# Patient Record
Sex: FEMALE | Race: WHITE | Employment: UNEMPLOYED | URBAN - METROPOLITAN AREA
[De-identification: names, ages, dates, MRNs, and addresses within clinical notes are randomized per-mention and may not be internally consistent; named-entity substitution may affect disease eponyms.]

---

## 2019-04-13 PROBLEM — R20.0 LEFT FACIAL NUMBNESS: Status: ACTIVE | Noted: 2019-04-13

## 2019-08-02 ENCOUNTER — OFFICE VISIT (OUTPATIENT)
Dept: CARDIOLOGY CLINIC | Age: 64
End: 2019-08-02

## 2019-08-02 VITALS
SYSTOLIC BLOOD PRESSURE: 147 MMHG | OXYGEN SATURATION: 97 % | WEIGHT: 200 LBS | HEIGHT: 66 IN | HEART RATE: 77 BPM | BODY MASS INDEX: 32.14 KG/M2 | DIASTOLIC BLOOD PRESSURE: 81 MMHG

## 2019-08-02 DIAGNOSIS — G45.9 TIA (TRANSIENT ISCHEMIC ATTACK): Primary | ICD-10-CM

## 2019-08-02 DIAGNOSIS — E11.8 TYPE 2 DIABETES MELLITUS WITH COMPLICATION, WITHOUT LONG-TERM CURRENT USE OF INSULIN (HCC): ICD-10-CM

## 2019-08-02 DIAGNOSIS — I10 ESSENTIAL HYPERTENSION: ICD-10-CM

## 2019-08-02 DIAGNOSIS — Z86.73 HISTORY OF CVA (CEREBROVASCULAR ACCIDENT): ICD-10-CM

## 2019-08-02 DIAGNOSIS — G47.33 OSA (OBSTRUCTIVE SLEEP APNEA): ICD-10-CM

## 2019-08-02 RX ORDER — LEVOTHYROXINE SODIUM 75 UG/1
TABLET ORAL
COMMUNITY

## 2019-08-02 RX ORDER — ATORVASTATIN CALCIUM 80 MG/1
TABLET, FILM COATED ORAL
COMMUNITY

## 2019-08-02 NOTE — PROGRESS NOTES
1. Have you been to the ER, urgent care clinic since your last visit? Hospitalized since your last visit? No    2. Have you seen or consulted any other health care providers outside of the [address] [place] [address] since your last visit? Include any pap smears or colon screening.  No

## 2019-08-10 PROBLEM — I10 ESSENTIAL HYPERTENSION: Status: ACTIVE | Noted: 2019-08-10

## 2019-08-10 PROBLEM — Z86.73 HISTORY OF CVA (CEREBROVASCULAR ACCIDENT): Status: ACTIVE | Noted: 2019-08-10

## 2019-08-10 PROBLEM — G47.33 OSA (OBSTRUCTIVE SLEEP APNEA): Status: ACTIVE | Noted: 2019-08-10

## 2019-08-10 NOTE — PROGRESS NOTES
Subjective: [facility] is in the office today for cardiac evaluation. She has a history of CVA in March 2019. She also was readmitted in April 2019 for more left-sided facial tingling and numbness. She has had a previous occipital infarct related to a right PCA stenosis with consequent residual visual field cut over left visual field. In regard to prior cardiac evaluation she had a Holter monitor done in May of this year. It demonstrated sinus rhythm throughout. There were rare PACs. There were 2 episodes of 4 beat SVT. She also had an echocardiogram done at that time. The echocardiogram was essentially normal.  There was normal systolic function. There was grade 1 diastolic dysfunction. She had a negative bubble study in March 2019. She has no known history of atrial fibrillation. He has no palpitations. She was having some shortness of breath which improved with physical therapy, walking with her neighbor, etc.  She has had no chest pain. She has had no near syncope or syncope. Patient's cardiac risk factors are dyslipidemia, diabetes mellitus, hypertension.         Patient Active Problem List    Diagnosis Date Noted    Essential hypertension 08/10/2019    History of CVA (cerebrovascular accident) 08/10/2019    FINA (obstructive sleep apnea) 08/10/2019    Left facial numbness 04/13/2019    Other and unspecified hyperlipidemia 05/03/2010    Depressive disorder, not elsewhere classified 05/03/2010    Reflux 05/03/2010    Diverticulosis 05/03/2010    OA (osteoarthritis) 05/03/2010    Type 2 diabetes mellitus with complication, without long-term current use of insulin ([facility] 05/03/2010    Unspecified hypothyroidism 05/03/2010    Vitamin D deficiency 05/03/2010    Insomnia 05/03/2010    HSV infection 05/03/2010     Current Outpatient Medications   Medication Sig Dispense Refill    atorvastatin (LIPITOR) 80 mg tablet TAKE 1 TABLET BY MOUTH AT BEDTIME      levothyroxine (SYNTHROID) 75 mcg tablet TAKE 1 TABLET BY MOUTH ONCE DAILY IN THE MORNING ON AN EMPTY STOMACH      clopidogrel (PLAVIX) 75 mg tab Take 1 Tab by mouth daily. 30 Tab 2    losartan (COZAAR) 25 mg tablet Take 25 mg by mouth daily. Indications: high blood pressure      raNITIdine (ZANTAC) 150 mg tablet Take 150 mg by mouth two (2) times a day. Indications: Stomach Ulcer from Aspirin or Ibuprofen-Like Drugs      aspirin (ASPIRIN) 325 mg tablet Take 325 mg by mouth daily. Indications: Prevention for a Blood Clot going to the Brain      metFORMIN (GLUCOPHAGE) 1,000 mg tablet Take 1,000 mg by mouth two (2) times daily (with meals). Indications: type 2 diabetes mellitus      citalopram (CELEXA) 20 mg tablet Take 20 mg by mouth daily.  dextroamphetamine (DEXTROSTAT) 10 mg tab Take 10 mg by mouth daily.  cyanocobalamin (VITAMIN B-12) 1,000 mcg tablet Take 1,000 mcg by mouth daily.  cholecalciferol, vitamin d3, (VITAMIN D) 1,000 unit tablet Take  by mouth daily. Allergies   Allergen Reactions    Lisinopril Cough     Past Medical History:   Diagnosis Date    Diabetes ([facility]     Hypertension     [facility]      No past surgical history on file. No family history on file.   Social History     Tobacco Use   Smoking Status Never Smoker   Smokeless Tobacco Never Used          Review of Systems, additional:  Constitutional: negative  Eyes: negative  Respiratory: negative  Cardiovascular: negative  Gastrointestinal: negative  Musculoskeletal:negative  Neurological: negative  Behvioral/Psych: negative  Endocrine: negative  ENT: negative    Objective:     Visit Vitals  /81   Pulse 77   Ht 5' 6\" (1.676 m)   Wt 200 lb (90.7 kg)   SpO2 97%   BMI 32.28 kg/m²     General:  alert, cooperative, no distress   Chest Wall: inspection normal - no chest wall deformities or tenderness, respiratory effort normal   Lung: clear to auscultation bilaterally   Heart:  normal rate and regular rhythm, S1 and S2 normal, no murmurs noted, no gallops noted, no JVD   Abdomen: soft, non-tender. Bowel sounds normal. No masses,  no organomegaly   Extremities: extremities normal, atraumatic, no cyanosis or edema Skin: no rashes   Neuro: alert, oriented, normal speech, no  movement disorder noted     EK2019. Normal sinus rhythm. Normal.    Assessment/Plan:       ICD-10-CM ICD-9-CM    1. TIA (transient ischemic attack), will arrange for 30-day event monitor. Return in 6 weeks G45.9 435.9 CARDIAC EVENT MONITOR   2. Type 2 diabetes mellitus with complication, without long-term current use of insulin (HCC) E11.8 250.90    3. History of CVA (cerebrovascular accident), right occipital infarct with right CVA stenosis with residual visual field cut over left visual field. She follows with  Z86.73 V12.54    4. Essential hypertension, mildly elevated systolic BP in the office today I10 401.9    5. FINA (obstructive sleep apnea) G47.33 327.23    6       History of echocardiogram, 2019. Normal left ventricular systolic function. Grade 1 diastolic dysfunction. No valvular pathology.   Negative  bubble study

## 2019-09-18 ENCOUNTER — OFFICE VISIT (OUTPATIENT)
Dept: CARDIOLOGY CLINIC | Age: 64
End: 2019-09-18

## 2019-09-18 ENCOUNTER — TELEPHONE (OUTPATIENT)
Dept: CARDIOLOGY CLINIC | Age: 64
End: 2019-09-18

## 2019-09-18 VITALS
DIASTOLIC BLOOD PRESSURE: 82 MMHG | BODY MASS INDEX: 32.62 KG/M2 | HEART RATE: 65 BPM | OXYGEN SATURATION: 98 % | HEIGHT: 66 IN | SYSTOLIC BLOOD PRESSURE: 189 MMHG | WEIGHT: 203 LBS

## 2019-09-18 DIAGNOSIS — R20.9 CVA, OLD, ALTERATIONS OF SENSATIONS: Primary | ICD-10-CM

## 2019-09-18 DIAGNOSIS — I69.398 CVA, OLD, ALTERATIONS OF SENSATIONS: Primary | ICD-10-CM

## 2019-09-18 NOTE — PROGRESS NOTES
1. Have you been to the ER, urgent care clinic since your last visit? Hospitalized since your last visit? No    2. Have you seen or consulted any other health care providers outside of the [address] [place] [address] since your last visit? Include any pap smears or colon screening.  No

## 2019-09-18 NOTE — TELEPHONE ENCOUNTER
Verbal order and read back per Kishan Moore MD  Received end of service report no need for extra 2 weeks

## 2019-09-18 NOTE — TELEPHONE ENCOUNTER
Contacted pt at cell number. Two patient Identifiers confirmed. Advised pt per Dr Edith Apley notes. Pt verbalized understanding.

## 2019-09-22 NOTE — PROGRESS NOTES
Subjective: Claudene Aures is in the office today for cardiac evaluation. She has a history of a CVA in March 2019. She also was readmitted in April 2019 for more left-sided facial tingling and numbness. She has had a previous occipital infarct related to a right PCA stenosis with consequent residual visual field cut over her left visual field. In regard to prior cardiac evaluation she had a Holter monitor done in May of this year. It demonstrated sinus rhythm throughout. There were rare PACs. There were 2 episodes of 4 beat SVT. She also had an echocardiogram done at that time. The echocardiogram was essentially normal.  There was normal systolic function. There was grade 1 diastolic dysfunction. She had a negative bubble study in March 2019.    30-day monitor was ordered. It appears that it is complete and does not demonstrate any evidence for atrial fibrillation. In the office today she says she is \"okay \". She has had no dizziness or lightheadedness. She does report that her heart has \"kind of fast beats every once in a while \". She has had no chest pain or shortness of breath. She has been walking nightly with her neighbor. Patient's cardiac risk factors are dyslipidemia, diabetes mellitus, hypertension.         Patient Active Problem List    Diagnosis Date Noted    Essential hypertension 08/10/2019    History of CVA (cerebrovascular accident) 08/10/2019    FINA (obstructive sleep apnea) 08/10/2019    Left facial numbness 04/13/2019    Other and unspecified hyperlipidemia 05/03/2010    Depressive disorder, not elsewhere classified 05/03/2010    Reflux 05/03/2010    Diverticulosis 05/03/2010    OA (osteoarthritis) 05/03/2010    Type 2 diabetes mellitus with complication, without long-term current use of insulin ([facility] 05/03/2010    Unspecified hypothyroidism 05/03/2010    Vitamin D deficiency 05/03/2010    Insomnia 05/03/2010    HSV infection 05/03/2010     Current Outpatient Medications   Medication Sig Dispense Refill    atorvastatin (LIPITOR) 80 mg tablet TAKE 1 TABLET BY MOUTH AT BEDTIME      levothyroxine (SYNTHROID) 75 mcg tablet TAKE 1 TABLET BY MOUTH ONCE DAILY IN THE MORNING ON AN EMPTY STOMACH      clopidogrel (PLAVIX) 75 mg tab Take 1 Tab by mouth daily. 30 Tab 2    losartan (COZAAR) 25 mg tablet Take 25 mg by mouth daily. Indications: high blood pressure      raNITIdine (ZANTAC) 150 mg tablet Take 150 mg by mouth two (2) times a day. Indications: Stomach Ulcer from Aspirin or Ibuprofen-Like Drugs      aspirin (ASPIRIN) 325 mg tablet Take 325 mg by mouth daily. Indications: Prevention for a Blood Clot going to the Brain      metFORMIN (GLUCOPHAGE) 1,000 mg tablet Take 1,000 mg by mouth two (2) times daily (with meals). Indications: type 2 diabetes mellitus      citalopram (CELEXA) 20 mg tablet Take 20 mg by mouth daily.  dextroamphetamine (DEXTROSTAT) 10 mg tab Take 10 mg by mouth daily.  cyanocobalamin (VITAMIN B-12) 1,000 mcg tablet Take 1,000 mcg by mouth daily.  cholecalciferol, vitamin d3, (VITAMIN D) 1,000 unit tablet Take  by mouth daily. Allergies   Allergen Reactions    Lisinopril Cough     Past Medical History:   Diagnosis Date    Diabetes ([facility]     Hypertension     [facility]      No past surgical history on file. No family history on file.   Social History     Tobacco Use   Smoking Status Never Smoker   Smokeless Tobacco Never Used          Review of Systems, additional:  Constitutional: negative  Eyes: negative  Respiratory: negative  Cardiovascular: negative  Gastrointestinal: negative  Musculoskeletal:negative  Neurological: negative  Behvioral/Psych: negative  Endocrine: negative  ENT: negative    Objective:     Visit Vitals  /82   Pulse 65   Ht 5' 6\" (1.676 m)   Wt 92.1 kg (203 lb)   SpO2 98%   BMI 32.77 kg/m²     General:  alert, cooperative, no distress   Chest Wall: inspection normal - no chest wall deformities or tenderness, respiratory effort normal   Lung: clear to auscultation bilaterally   Heart:  normal rate and regular rhythm, S1 and S2 normal, no murmurs noted, no gallops noted, no JVD   Abdomen: soft, non-tender. Bowel sounds normal. No masses,  no organomegaly   Extremities: extremities normal, atraumatic, no cyanosis or edema Skin: no rashes   Neuro: alert, oriented, normal speech, no  movement disorder noted     EK2019. Normal sinus rhythm. Normal.    Assessment/Plan:       ICD-10-CM ICD-9-CM    1. TIA (transient ischemic attack), will arrange for 30-day event monitor. Reviewed most recent data with no significant arrhythmia noted. Return 4 weeks G45.9 435.9 CARDIAC EVENT MONITOR   2. Type 2 diabetes mellitus with complication, without long-term current use of insulin (HCC) E11.8 250.90    3. History of CVA (cerebrovascular accident), right occipital infarct with right CVA stenosis with residual visual field cut over left visual field. She follows with  Z86.73 V12.54    4. Essential hypertension, mildly elevated systolic BP in the office again today I10 401.9    5. FINA (obstructive sleep apnea) G47.33 327.23    6       History of echocardiogram, 2019. Normal left ventricular systolic function. Grade 1 diastolic dysfunction. No valvular pathology.   Negative  bubble study

## 2019-10-18 ENCOUNTER — OFFICE VISIT (OUTPATIENT)
Dept: CARDIOLOGY CLINIC | Age: 64
End: 2019-10-18

## 2019-10-18 VITALS
OXYGEN SATURATION: 97 % | BODY MASS INDEX: 32.77 KG/M2 | HEART RATE: 64 BPM | WEIGHT: 203 LBS | DIASTOLIC BLOOD PRESSURE: 83 MMHG | SYSTOLIC BLOOD PRESSURE: 168 MMHG

## 2019-10-18 DIAGNOSIS — Z86.73 HISTORY OF CVA (CEREBROVASCULAR ACCIDENT): Primary | ICD-10-CM

## 2019-10-18 NOTE — PROGRESS NOTES
1. Have you been to the ER, urgent care clinic since your last visit? Hospitalized since your last visit? No     2. Have you seen or consulted any other health care providers outside of the [address] [place] [address] since your last visit? Include any pap smears or colon screening.   No

## 2019-10-23 NOTE — PROGRESS NOTES
Subjective: Yolanda Finch is in the office today for cardiac re-evaluation. She has a history of a CVA in March 2019. She also was readmitted in April 2019 for more left-sided facial tingling and numbness. She has had a previous occipital infarct related to a right PCA stenosis with consequent residual visual field cut over her left visual field. In regard to prior cardiac evaluation she had a Holter monitor done in May of this year. It demonstrated sinus rhythm throughout. There were rare PACs. There were 2 episodes of 4 beat SVT. She also had an echocardiogram done at that time. The echocardiogram was essentially normal.  There was normal systolic function. There was grade 1 diastolic dysfunction. She had a negative bubble study in March 2019.    30-day monitor was ordered. Monitor did not demonstrate any evidence for atrial fibrillation. In the office today she says she is doing well. She has not had much palpitations. She continues in physical therapy for her stroke. She is trying to be as independent as possible. She is walking a mile a day with her neighbor  Patient's cardiac risk factors are dyslipidemia, diabetes mellitus, hypertension.         Patient Active Problem List    Diagnosis Date Noted    Essential hypertension 08/10/2019    History of CVA (cerebrovascular accident) 08/10/2019    FINA (obstructive sleep apnea) 08/10/2019    Left facial numbness 04/13/2019    Other and unspecified hyperlipidemia 05/03/2010    Depressive disorder, not elsewhere classified 05/03/2010    Reflux 05/03/2010    Diverticulosis 05/03/2010    OA (osteoarthritis) 05/03/2010    Type 2 diabetes mellitus with complication, without long-term current use of insulin ([facility] 05/03/2010    Unspecified hypothyroidism 05/03/2010    Vitamin D deficiency 05/03/2010    Insomnia 05/03/2010    HSV infection 05/03/2010     Current Outpatient Medications   Medication Sig Dispense Refill    atorvastatin (LIPITOR) 80 mg tablet TAKE 1 TABLET BY MOUTH AT BEDTIME      levothyroxine (SYNTHROID) 75 mcg tablet TAKE 1 TABLET BY MOUTH ONCE DAILY IN THE MORNING ON AN EMPTY STOMACH      clopidogrel (PLAVIX) 75 mg tab Take 1 Tab by mouth daily. 30 Tab 2    losartan (COZAAR) 25 mg tablet Take 25 mg by mouth daily. Indications: high blood pressure      raNITIdine (ZANTAC) 150 mg tablet Take 150 mg by mouth two (2) times a day. Indications: Stomach Ulcer from Aspirin or Ibuprofen-Like Drugs      aspirin (ASPIRIN) 325 mg tablet Take 325 mg by mouth daily. Indications: Prevention for a Blood Clot going to the Brain      metFORMIN (GLUCOPHAGE) 1,000 mg tablet Take 1,000 mg by mouth two (2) times daily (with meals). Indications: type 2 diabetes mellitus      citalopram (CELEXA) 20 mg tablet Take 20 mg by mouth daily.  cyanocobalamin (VITAMIN B-12) 1,000 mcg tablet Take 1,000 mcg by mouth daily.  cholecalciferol, vitamin d3, (VITAMIN D) 1,000 unit tablet Take  by mouth daily. Allergies   Allergen Reactions    Lisinopril Cough     Past Medical History:   Diagnosis Date    Diabetes ([facility]     Hypertension     [facility]      No past surgical history on file. No family history on file.   Social History     Tobacco Use   Smoking Status Never Smoker   Smokeless Tobacco Never Used          Review of Systems, additional:  Constitutional: negative  Eyes: negative  Respiratory: negative  Cardiovascular: negative  Gastrointestinal: negative  Musculoskeletal:negative  Neurological: negative  Behvioral/Psych: negative  Endocrine: negative  ENT: negative    Objective:     Visit Vitals  /83   Pulse 64   Wt 92.1 kg (203 lb)   SpO2 97%   BMI 32.77 kg/m²     General:  alert, cooperative, no distress   Chest Wall: inspection normal - no chest wall deformities or tenderness, respiratory effort normal   Lung: clear to auscultation bilaterally   Heart:  normal rate and regular rhythm, S1 and S2 normal, no murmurs noted, no gallops noted, no JVD   Abdomen: soft, non-tender. Bowel sounds normal. No masses,  no organomegaly   Extremities: extremities normal, atraumatic, no cyanosis or edema Skin: no rashes   Neuro: alert, oriented, normal speech, no  movement disorder noted     EK2019. Normal sinus rhythm. Normal.    Assessment/Plan:       ICD-10-CM ICD-9-CM    1. TIA (transient ischemic attack), will arrange for 30-day event monitor. Reviewed most recent data with no significant arrhythmia noted. Return 6 mos6 months G45.9 435.9 CARDIAC EVENT MONITOR   2. Type 2 diabetes mellitus with complication, without long-term current use of insulin (HCC) E11.8 250.90    3. History of CVA (cerebrovascular accident), right occipital infarct with right CVA stenosis with residual visual field cut over left visual field. She follows with  Z86.73 V12.54    4. Essential hypertension, moderately elevated systolic BP in the office again today I10 401.9    5. FINA (obstructive sleep apnea) G47.33 327.23    6       History of echocardiogram, 2019. Normal left ventricular systolic function. Grade 1 diastolic dysfunction. No valvular pathology.   Negative  bubble study

## 2019-11-22 ENCOUNTER — HOSPITAL ENCOUNTER (OUTPATIENT)
Dept: LAB | Age: 64
Discharge: HOME | End: 2019-11-22
Payer: MEDICARE

## 2019-11-22 ENCOUNTER — OFFICE VISIT (OUTPATIENT)
Dept: FAMILY MEDICINE CLINIC | Age: 64
End: 2019-11-22

## 2019-11-22 VITALS
RESPIRATION RATE: 18 BRPM | OXYGEN SATURATION: 97 % | HEART RATE: 66 BPM | DIASTOLIC BLOOD PRESSURE: 81 MMHG | BODY MASS INDEX: 33.43 KG/M2 | SYSTOLIC BLOOD PRESSURE: 175 MMHG | TEMPERATURE: 98.2 F | HEIGHT: 66 IN | WEIGHT: 208 LBS

## 2019-11-22 DIAGNOSIS — I10 ESSENTIAL HYPERTENSION: ICD-10-CM

## 2019-11-22 DIAGNOSIS — E11.8 TYPE 2 DIABETES MELLITUS WITH COMPLICATION, WITHOUT LONG-TERM CURRENT USE OF INSULIN (HCC): ICD-10-CM

## 2019-11-22 DIAGNOSIS — E11.8 TYPE 2 DIABETES MELLITUS WITH COMPLICATION, WITHOUT LONG-TERM CURRENT USE OF INSULIN (HCC): Primary | ICD-10-CM

## 2019-11-22 DIAGNOSIS — E55.9 VITAMIN D DEFICIENCY: ICD-10-CM

## 2019-11-22 DIAGNOSIS — Z86.73 HISTORY OF CVA (CEREBROVASCULAR ACCIDENT): ICD-10-CM

## 2019-11-22 DIAGNOSIS — E03.9 ACQUIRED HYPOTHYROIDISM: ICD-10-CM

## 2019-11-22 DIAGNOSIS — Z71.89 ACP (ADVANCE CARE PLANNING): ICD-10-CM

## 2019-11-22 DIAGNOSIS — Z00.00 WELCOME TO MEDICARE PREVENTIVE VISIT: ICD-10-CM

## 2019-11-22 DIAGNOSIS — M81.0 OSTEOPOROSIS WITHOUT CURRENT PATHOLOGICAL FRACTURE, UNSPECIFIED OSTEOPOROSIS TYPE: ICD-10-CM

## 2019-11-22 DIAGNOSIS — F32.A DEPRESSION, UNSPECIFIED DEPRESSION TYPE: ICD-10-CM

## 2019-11-22 LAB
ALBUMIN SERPL-MCNC: 4.1 G/DL (ref 3.4–5)
ALBUMIN/GLOB SERPL: 1.2 {RATIO} (ref 0.8–1.7)
ALP SERPL-CCNC: 94 U/L (ref 45–117)
ALT SERPL-CCNC: 30 U/L (ref 13–56)
ANION GAP SERPL CALC-SCNC: 4 MMOL/L (ref 3–18)
AST SERPL-CCNC: 11 U/L (ref 10–38)
BILIRUB SERPL-MCNC: 0.5 MG/DL (ref 0.2–1)
BUN SERPL-MCNC: 15 MG/DL (ref 7–18)
BUN/CREAT SERPL: 21 (ref 12–20)
CALCIUM SERPL-MCNC: 9.2 MG/DL (ref 8.5–10.1)
CHLORIDE SERPL-SCNC: 104 MMOL/L (ref 100–111)
CO2 SERPL-SCNC: 31 MMOL/L (ref 21–32)
CREAT SERPL-MCNC: 0.73 MG/DL (ref 0.6–1.3)
GLOBULIN SER CALC-MCNC: 3.3 G/DL (ref 2–4)
GLUCOSE SERPL-MCNC: 217 MG/DL (ref 74–99)
POTASSIUM SERPL-SCNC: 4.9 MMOL/L (ref 3.5–5.5)
PROT SERPL-MCNC: 7.4 G/DL (ref 6.4–8.2)
SODIUM SERPL-SCNC: 139 MMOL/L (ref 136–145)

## 2019-11-22 PROCEDURE — 82306 VITAMIN D 25 HYDROXY: CPT

## 2019-11-22 PROCEDURE — 80053 COMPREHEN METABOLIC PANEL: CPT

## 2019-11-22 PROCEDURE — 82043 UR ALBUMIN QUANTITATIVE: CPT

## 2019-11-22 PROCEDURE — 84439 ASSAY OF FREE THYROXINE: CPT

## 2019-11-22 PROCEDURE — 83036 HEMOGLOBIN GLYCOSYLATED A1C: CPT

## 2019-11-22 RX ORDER — BLOOD SUGAR DIAGNOSTIC
STRIP MISCELLANEOUS
COMMUNITY
Start: 2019-11-05

## 2019-11-22 NOTE — PROGRESS NOTES
Charanjit Monet is a 71 y.o. female  Here today for new establishment of care. Had two strokes this year, first one back in March 2019 and second in April 2019. Notes already in chart and pt has brought in notes and all specialist she sees.

## 2019-11-22 NOTE — PATIENT INSTRUCTIONS
Medicare Wellness Visit, Female The best way to live healthy is to have a lifestyle where you eat a well-balanced diet, exercise regularly, limit alcohol use, and quit all forms of tobacco/nicotine, if applicable. Regular preventive services are another way to keep healthy. Preventive services (vaccines, screening tests, monitoring & exams) can help personalize your care plan, which helps you manage your own care. Screening tests can find health problems at the earliest stages, when they are easiest to treat. [facility] follows the current, evidence-based guidelines published by the [facility] ([facility]) when recommending preventive services for our patients. Because we follow these guidelines, sometimes recommendations change over time as research supports it. (For example, mammograms used to be recommended annually. Even though Medicare will still pay for an annual mammogram, the newer guidelines recommend a mammogram every two years for women of average risk). Of course, you and your doctor may decide to screen more often for some diseases, based on your risk and your co-morbidities (chronic disease you are already diagnosed with). Preventive services for you include: - Medicare offers their members a free annual wellness visit, which is time for you and your primary care provider to discuss and plan for your preventive service needs. Take advantage of this benefit every year! 
-All adults over the age of 72 should receive the recommended pneumonia vaccines. Current USPSTF guidelines recommend a series of two vaccines for the best pneumonia protection.  
-All adults should have a flu vaccine yearly and a tetanus vaccine every 10 years.  
-All adults age 48 and older should receive the shingles vaccines (series of two vaccines). -All adults age 38-68 who are overweight should have a diabetes screening test once every three years. -All adults born between [birth year] and 1966 should be screened once for Hepatitis C. 
-Other screening tests and preventive services for persons with diabetes include: an eye exam to screen for diabetic retinopathy, a kidney function test, a foot exam, and stricter control over your cholesterol.  
-Cardiovascular screening for adults with routine risk involves an electrocardiogram (ECG) at intervals determined by your doctor.  
-Colorectal cancer screenings should be done for adults age 54-65 with no increased risk factors for colorectal cancer. There are a number of acceptable methods of screening for this type of cancer. Each test has its own benefits and drawbacks. Discuss with your doctor what is most appropriate for you during your annual wellness visit. The different tests include: colonoscopy (considered the best screening method), a fecal occult blood test, a fecal DNA test, and sigmoidoscopy. 
 
-A bone mass density test is recommended when a woman turns 65 to screen for osteoporosis. This test is only recommended one time, as a screening. Some providers will use this same test as a disease monitoring tool if you already have osteoporosis. -Breast cancer screenings are recommended every other year for women of normal risk, age 54-69. 
-Cervical cancer screenings for women over age 72 are only recommended with certain risk factors. Here is a list of your current Health Maintenance items (your personalized list of preventive services) with a due date: 
Health Maintenance Due Topic Date Due  
 Hepatitis C Test  1955 [facility] Diabetic Foot Care  07/09/1964  Albumin Urine Test  07/09/1964 [facility] Eye Exam  07/09/1964  DTaP/Tdap/Td  (1 - Tdap) 07/09/1965  Shingles Vaccine (1 of 2) 07/09/2004  Mammogram  07/09/2004  Stool testing for trace blood  07/09/2004 [facility] Annual Well Visit  07/08/2019  Glaucoma Screening   07/09/2019  Bone Mineral Density   07/09/2019  Pneumococcal Vaccine (1 of 1 - PPSV23) 07/09/2019  Flu Vaccine  08/01/2019  Hemoglobin A1C    10/14/2019

## 2019-11-22 NOTE — PROGRESS NOTES
Yogesh Navarrete is a 71 y.o. female who presents to the office today to establish care. She is a new patient here to [facility] care. She has recent hx of CVA, first in march and second in April 2019. She is seeing Neurology, Dr Rubina Bass.   She also sees Ophthalmology, Josue Romero with [facility] care and NeuroOphtholmologist with hx R Occipital Infarct d/t R PCA stenosis w/ residual visual field cut over left visual field  She sees Cardiology, Dr. Roque Hernandez. BP is elevated, but keeping permissive HTN for now. DM- on metformin. Checking blood glucose at home running in the 250's for the last month or so. Previously it was in the 120-160s. Not strict following a low carb diet. No increase in urination or thirst.   Hypothyroidism- Dx in 2009. She is taking synthroid 75 mcg. Depression- on celexa, working well to control her symptoms. Chief Complaint   [facility] with   [facility].   . Current Outpatient Medications on File Prior to Visit   Medication Sig Dispense Refill    ONETOUCH VERIO strip       atorvastatin (LIPITOR) 80 mg tablet TAKE 1 TABLET BY MOUTH AT BEDTIME      levothyroxine (SYNTHROID) 75 mcg tablet TAKE 1 TABLET BY MOUTH ONCE DAILY IN THE MORNING ON AN EMPTY STOMACH      clopidogrel (PLAVIX) 75 mg tab Take 1 Tab by mouth daily. 30 Tab 2    losartan (COZAAR) 25 mg tablet Take 25 mg by mouth daily. Indications: high blood pressure      raNITIdine (ZANTAC) 150 mg tablet Take 150 mg by mouth two (2) times a day. Indications: Stomach Ulcer from Aspirin or Ibuprofen-Like Drugs      aspirin (ASPIRIN) 325 mg tablet Take 325 mg by mouth daily. Indications: Prevention for a Blood Clot going to the Brain      metFORMIN (GLUCOPHAGE) 1,000 mg tablet Take 1,000 mg by mouth two (2) times daily (with meals). Indications: type 2 diabetes mellitus      citalopram (CELEXA) 20 mg tablet Take 20 mg by mouth daily.  cyanocobalamin (VITAMIN B-12) 1,000 mcg tablet Take 1,000 mcg by mouth daily.  cholecalciferol, vitamin d3, (VITAMIN D) 1,000 unit tablet Take  by mouth daily. No current facility-administered medications on file prior to visit. Allergies   Allergen Reactions    Lisinopril Cough     Past Medical History:   Diagnosis Date    Diabetes ([facility]     Hypertension     Stroke ([facility]     more than one. first one March 19, 2019. Second was mid april 2019. Social History     Tobacco Use   Smoking Status Never Smoker   Smokeless Tobacco Never Used     Social History     Substance and Sexual Activity   Alcohol Use Never    Frequency: Never     History reviewed. No pertinent family history. Review of Systems   Constitutional: Positive for malaise/fatigue. Negative for fever and weight loss. Eyes: Positive for blurred vision. Respiratory: Negative for shortness of breath. Cardiovascular: Negative for chest pain, palpitations and leg swelling. Gastrointestinal: Negative for constipation, diarrhea, nausea and vomiting. Genitourinary: Negative for frequency. Musculoskeletal: Negative for falls. Neurological: Negative for dizziness and loss of consciousness. Endo/Heme/Allergies: Negative for polydipsia. Psychiatric/Behavioral: Positive for depression. Negative for suicidal ideas. Well controlled     Visit Vitals  /81   Pulse 66   Temp 98.2 °F (36.8 °C) (Oral)   Resp 18   Ht 5' 6\" (1.676 m)   Wt 208 lb (94.3 kg)   SpO2 97%   BMI 33.57 kg/m²     Physical Exam  Vitals signs and nursing note reviewed. Constitutional:       Appearance: Normal appearance. Neck:      Musculoskeletal: Neck supple. Thyroid: No thyromegaly. Cardiovascular:      Rate and Rhythm: Normal rate and regular rhythm. Pulses: Normal pulses. Pulmonary:      Breath sounds: Normal breath sounds. Musculoskeletal:         General: No swelling. Skin:     General: Skin is warm and dry. Capillary Refill: Capillary refill takes less than 2 seconds.    Neurological: Mental Status: She is alert and oriented to person, place, and time. Psychiatric:         Mood and Affect: Mood normal.         Behavior: Behavior normal.         Thought Content: Thought content normal.         Assessment and Plan    ICD-10-CM ICD-9-CM    1. Type 2 diabetes mellitus with complication, without long-term current use of insulin ([facility]) P09.6 956.85 METABOLIC PANEL, COMPREHENSIVE      HEMOGLOBIN A1C WITH EAG      MICROALBUMIN, UR, RAND W/ MICROALB/CREAT RATIO   2. Vitamin D deficiency E55.9 268.9 VITAMIN D, 25 HYDROXY   3. Osteoporosis without current pathological fracture, unspecified osteoporosis type M81.0 733.00    4. History of CVA (cerebrovascular accident) F95.65 B52.99 METABOLIC PANEL, COMPREHENSIVE      TSH AND FREE T4      HEMOGLOBIN A1C WITH EAG      MICROALBUMIN, UR, RAND W/ MICROALB/CREAT RATIO   5. Essential hypertension R40 754.3 METABOLIC PANEL, COMPREHENSIVE      HEMOGLOBIN A1C WITH EAG      MICROALBUMIN, UR, RAND W/ MICROALB/CREAT RATIO   6. Acquired hypothyroidism E03.9 244.9 TSH AND FREE T4   7. Depression, unspecified depression type F32.9 311    8. Welcome to Medicare preventive visit Z00.00 V70.0 ADVANCE CARE PLANNING FIRST 30 MINS   9. ACP (advance care planning) Z71.89 V65.49 ADVANCE CARE PLANNING FIRST 30 MINS     Checking diabetic labs today. Continue metformin and checking glucose daily. Encouraged to follow a low carb diet. Will check vitamin D level with hx of Osteoporosis   Continue close follow up with Neuro. On plavix, ASA and statin. HTN- elevated today in the office. No changes made to medication regimen at last cardiology visit in October. Thyroid- checking levels today. Continue current dose of synthroid  Depression is well controlled on celexa. [address] documented in separate note. Reviewed medication and side effects. Patient agrees with the plan and verbalizes understanding.      Follow-up and Dispositions    · Return in about 2 months (around 1/22/2020) for HTN, DM, HLD, Depression. Follow-up and Dispositions    · Return in about 2 months (around 1/22/2020) for HTN, DM, HLD, Depression. Elizabeth Christianson PA-C  12/1/2019    PLEASE NOTE:  This document has been produced using voice recognition software. Unrecognized errors in transcription may be present. This is a \"Welcome to [employer]"  Initial Preventive Physical Examination (IPPE) providing Personalized Prevention Plan Services (Performed in the first 12 months of enrollment)    I have reviewed the patient's medical history in detail and updated the computerized patient record. History     Past Medical History:   Diagnosis Date    Diabetes ([facility]     Hypertension     Stroke ([facility]     more than one. first one March 19, 2019. Second was mid april 2019. History reviewed. No pertinent surgical history. Current Outpatient Medications   Medication Sig Dispense Refill    ONETOUCH VERIO strip       atorvastatin (LIPITOR) 80 mg tablet TAKE 1 TABLET BY MOUTH AT BEDTIME      levothyroxine (SYNTHROID) 75 mcg tablet TAKE 1 TABLET BY MOUTH ONCE DAILY IN THE MORNING ON AN EMPTY STOMACH      clopidogrel (PLAVIX) 75 mg tab Take 1 Tab by mouth daily. 30 Tab 2    losartan (COZAAR) 25 mg tablet Take 25 mg by mouth daily. Indications: high blood pressure      raNITIdine (ZANTAC) 150 mg tablet Take 150 mg by mouth two (2) times a day. Indications: Stomach Ulcer from Aspirin or Ibuprofen-Like Drugs      aspirin (ASPIRIN) 325 mg tablet Take 325 mg by mouth daily. Indications: Prevention for a Blood Clot going to the Brain      metFORMIN (GLUCOPHAGE) 1,000 mg tablet Take 1,000 mg by mouth two (2) times daily (with meals). Indications: type 2 diabetes mellitus      citalopram (CELEXA) 20 mg tablet Take 20 mg by mouth daily.  cyanocobalamin (VITAMIN B-12) 1,000 mcg tablet Take 1,000 mcg by mouth daily.  cholecalciferol, vitamin d3, (VITAMIN D) 1,000 unit tablet Take  by mouth daily. Allergies   Allergen Reactions    Lisinopril Cough       History reviewed. No pertinent family history. Social History     Tobacco Use    Smoking status: Never Smoker    Smokeless tobacco: Never Used   Substance Use Topics    Alcohol use: Never     Frequency: Never       Depression Risk Screen     3 most recent PHQ Screens 11/22/2019   Little interest or pleasure in doing things Not at all   Feeling down, depressed, irritable, or hopeless Not at all   Total Score PHQ 2 0       Alcohol Risk Factor Screening:   Do you average 1 drink per night or more than 7 drinks a week:  no    On any one occasion in the past three months have you have had more than 3 drinks containing alcohol:  No      Functional Ability and Level of Safety   Diet: The patient is prescribed and follows a special diet. follows a low salt low cholesterol diet. Hearing: Hearing is good. Vision Screening:  Vision is poor. she is already seeing [facility]  Vision Screening Comments: Patient refused stating she has an eye doctor and just saw Dr. Kana Hummel 2 days ago.       ADL Assessment 11/22/2019   Feeding yourself No Help Needed   Getting from bed to chair No Help Needed   Getting dressed No Help Needed   Bathing or showering No Help Needed   Walk across the room (includes cane/walker) No Help Needed   Using the telphone No Help Needed   Taking your medications No Help Needed   Preparing meals No Help Needed   Managing money (expenses/bills) No Help Needed   Moderately strenuous housework (laundry) No Help Needed   Shopping for personal items (toiletries/medicines) No Help Needed   Shopping for groceries No Help Needed   Driving Help Needed   Climbing a flight of stairs No Help Needed   Getting to places beyond walking distances Help Needed       Ambulation: with no difficulty    Exercise level: learning to become more active since her stroke, feeling more comfortable ambulating around the house and neighborhood    Fall Risk Screen:  Fall Risk Assessment, last 12 mths 11/22/2019   Able to walk? Yes   Fall in past 12 months? No     3 most recent PHQ Screens 11/22/2019   Little interest or pleasure in doing things Not at all   Feeling down, depressed, irritable, or hopeless Not at all   Total Score PHQ 2 0       Abuse Screen:  Patient is not abused    Screening EKG   EKG order placed: No   Last EKG done 4/13/2019 with NSR    Visit Vitals  /81   Pulse 66   Temp 98.2 °F (36.8 °C) (Oral)   Resp 18   Ht 5' 6\" (1.676 m)   Wt 208 lb (94.3 kg)   SpO2 97%   BMI 33.57 kg/m²     A&O x3  RRR  CTAB  Mood and affect normal    Patient Care Team   Patient Care Team:  Neha Pearson as PCP - General (Physician Assistant)  Sylvie Jackson MD (Neurology)  Casper Hammer MD (Cardiology)     End of Life Planning   Advanced care planning directives were discussed with the patient and /or family/caregiver. Assessment/Plan   Education and counseling provided:  Are appropriate based on today's review and evaluation  End-of-Life planning (with patient's consent)  Screening Mammography  Bone mass measurement (DEXA)  Screening for glaucoma    Diagnoses and all orders for this visit:    1. Type 2 diabetes mellitus with complication, without long-term current use of insulin (HCC)  -     METABOLIC PANEL, COMPREHENSIVE; Future  -     HEMOGLOBIN A1C WITH EAG; Future  -     MICROALBUMIN, UR, RAND W/ MICROALB/CREAT RATIO; Future    2. Vitamin D deficiency  -     VITAMIN D, 25 HYDROXY; Future    3. Osteoporosis without current pathological fracture, unspecified osteoporosis type    4. History of CVA (cerebrovascular accident)  -     METABOLIC PANEL, COMPREHENSIVE; Future  -     TSH AND FREE T4; Future  -     HEMOGLOBIN A1C WITH EAG; Future  -     MICROALBUMIN, UR, RAND W/ MICROALB/CREAT RATIO; Future    5. Essential hypertension  -     METABOLIC PANEL, COMPREHENSIVE; Future  -     HEMOGLOBIN A1C WITH EAG;  Future  -     MICROALBUMIN, UR, RAND W/ MICROALB/CREAT RATIO; Future    6. Acquired hypothyroidism  -     TSH AND FREE T4; Future    7. Depression, unspecified depression type    8. Welcome to Medicare preventive visit  -     ADVANCE CARE PLANNING FIRST 30 MINS    9.  ACP (advance care planning)  -     ADVANCE CARE PLANNING FIRST 30 MINS    Mammogram done in April 2019- will bring over report from [address] scan done in 2016- report in [facility]   Will obtain records from Eye doctor to update     Health Maintenance Due   Topic Date Due    Hepatitis C Screening  1955    FOOT EXAM Q1  07/09/1964    EYE EXAM RETINAL OR DILATED  07/09/1964    DTaP/Tdap/Td series (1 - Tdap) 07/09/1965    Shingrix Vaccine Age 50> (1 of 2) 07/09/2004    BREAST CANCER SCRN MAMMOGRAM  07/09/2004    FOBT Q 1 YEAR AGE 50-75  07/09/2004    GLAUCOMA SCREENING Q2Y  07/09/2019    Bone Densitometry (Dexa) Screening  07/09/2019    Pneumococcal 65+ years (1 of 1 - PPSV23) 07/09/2019    Influenza Age 9 to Adult  08/01/2019

## 2019-11-23 LAB
25(OH)D3 SERPL-MCNC: 48.2 NG/ML (ref 30–100)
CREAT UR-MCNC: 150 MG/DL (ref 30–125)
EST. AVERAGE GLUCOSE BLD GHB EST-MCNC: 237 MG/DL
HBA1C MFR BLD: 9.9 % (ref 4.2–5.6)
MICROALBUMIN UR-MCNC: 1.32 MG/DL (ref 0–3)
MICROALBUMIN/CREAT UR-RTO: 9 MG/G (ref 0–30)
T4 FREE SERPL-MCNC: 1.1 NG/DL (ref 0.7–1.5)
TSH SERPL DL<=0.05 MIU/L-ACNC: 0.72 UIU/ML (ref 0.36–3.74)

## 2019-12-01 PROBLEM — I63.9 CEREBROVASCULAR ACCIDENT (CVA) (HCC): Status: ACTIVE | Noted: 2019-03-18

## 2019-12-01 PROBLEM — I65.21 CAROTID STENOSIS, NON-SYMPTOMATIC, RIGHT: Status: ACTIVE | Noted: 2019-12-01

## 2019-12-01 NOTE — ACP (ADVANCE CARE PLANNING)
Advance Care Planning (ACP) Provider Note - Comprehensive     Date of ACP Conversation: 12/01/19  Persons included in Conversation:  patient  Length of ACP Conversation in minutes:  16 minutes    Authorized Decision Maker (if patient is incapable of making informed decisions): This person is:  Healthcare Agent/Medical Power of  under Advance Directive            General ACP for ALL Patients with Decision Making Capacity:   Importance of advance care planning, including choosing a healthcare agent to communicate patient's healthcare decisions if patient lost the ability to make decisions, such as after a sudden illness or accident  Understanding of the healthcare agent role was assessed and information provided    For Serious or Chronic Illness:  Understanding of medical condition    Understanding of CPR, goals and expected outcomes, benefits and burdens discussed.     Interventions Provided:  Recommended completion of Advance Directive form after review of ACP materials and conversation with prospective healthcare agent   Recommended communicating the plan and making copies for the healthcare agent, personal physician, and others as appropriate (e.g., health system)  Recommended review of completed ACP document annually or upon change in health status

## 2019-12-09 RX ORDER — LOSARTAN POTASSIUM 25 MG/1
25 TABLET ORAL DAILY
Qty: 90 TAB | Refills: 1 | Status: SHIPPED | OUTPATIENT
Start: 2019-12-09

## 2019-12-19 DIAGNOSIS — E11.8 TYPE 2 DIABETES MELLITUS WITH COMPLICATION, WITHOUT LONG-TERM CURRENT USE OF INSULIN (HCC): Primary | ICD-10-CM

## 2019-12-20 DIAGNOSIS — F32.A DEPRESSION, UNSPECIFIED DEPRESSION TYPE: Primary | ICD-10-CM

## 2019-12-20 RX ORDER — CITALOPRAM 20 MG/1
20 TABLET, FILM COATED ORAL DAILY
Qty: 30 TAB | Refills: 2 | Status: SHIPPED | OUTPATIENT
Start: 2019-12-20

## 2019-12-20 NOTE — PROGRESS NOTES
Spoke to pt and informed of new medication sent over to pharmacy.  Pt also stating she would like Celexa prescription sent over as well, is almost out

## 2019-12-20 NOTE — PROGRESS NOTES
A1c went up. I am changing her metformin to janumet- stop metformin and start new med. Will send new script to pharmacy. Remainder of labs look fine. Follow up in one month.

## 2020-06-12 ENCOUNTER — OFFICE VISIT (OUTPATIENT)
Dept: CARDIOLOGY CLINIC | Age: 65
End: 2020-06-12

## 2020-06-12 VITALS
OXYGEN SATURATION: 98 % | RESPIRATION RATE: 18 BRPM | HEIGHT: 66 IN | DIASTOLIC BLOOD PRESSURE: 72 MMHG | BODY MASS INDEX: 28.93 KG/M2 | HEART RATE: 61 BPM | WEIGHT: 180 LBS | SYSTOLIC BLOOD PRESSURE: 149 MMHG | TEMPERATURE: 97.6 F

## 2020-06-12 DIAGNOSIS — I10 ESSENTIAL HYPERTENSION: Primary | ICD-10-CM

## 2020-06-12 DIAGNOSIS — G45.9 TIA (TRANSIENT ISCHEMIC ATTACK): ICD-10-CM

## 2020-06-12 DIAGNOSIS — Z86.73 HISTORY OF CVA (CEREBROVASCULAR ACCIDENT): ICD-10-CM

## 2020-06-15 NOTE — PROGRESS NOTES
Subjective: Maura Leung is in the office today for cardiac re-evaluation. She has a history of a CVA in March 2019. She also was readmitted in April 2019 for more left-sided facial tingling and numbness. She has had a previous occipital infarct related to a right PCA stenosis with consequent residual visual field cut over her left visual field. In regard to prior cardiac evaluation she had a Holter monitor done in May of this year. It demonstrated sinus rhythm throughout. There were rare PACs. There were 2 episodes of 4 beat SVT. She also had an echocardiogram done at that time. The echocardiogram was essentially normal.  There was normal systolic function. There was grade 1 diastolic dysfunction. She had a negative bubble study in March 2019.    30-day monitor was ordered. During the monitoring period, there was no  evidence of atrial fibrillation. In the office today she says she is doing well. She is still walking on a near daily basis. She reports that her weight has improved and her glucoses are generally better. She has had no palpitations. She has had no dizziness, near syncope or syncope.       Patient Active Problem List    Diagnosis Date Noted    Carotid stenosis, non-symptomatic, right 12/01/2019    Hypertension 08/10/2019    History of CVA (cerebrovascular accident) 08/10/2019    FINA (obstructive sleep apnea) 08/10/2019    Left facial numbness 04/13/2019    [facility]/18/2019    Other and unspecified hyperlipidemia 05/03/2010    Depressive disorder, not elsewhere classified 05/03/2010    Reflux 05/03/2010    Diverticulosis 05/03/2010    OA (osteoarthritis) 05/03/2010    Diabetes mellitus ([facility] 05/03/2010    Unspecified hypothyroidism 05/03/2010    Vitamin D deficiency 05/03/2010    Insomnia 05/03/2010    HSV infection 05/03/2010     Current Outpatient Medications   Medication Sig Dispense Refill    citalopram (CELEXA) 20 mg tablet Take 1 Tab by mouth daily. 30 Tab 2    SITagliptin-metFORMIN (JANUMET)  mg per tablet Take 1 Tab by mouth two (2) times daily (with meals). 60 Tab 2    losartan (COZAAR) 25 mg tablet Take 1 Tab by mouth daily. Indications: high blood pressure 90 Tab 1    ONETOUCH VERIO strip       atorvastatin (LIPITOR) 80 mg tablet TAKE 1 TABLET BY MOUTH AT BEDTIME      levothyroxine (SYNTHROID) 75 mcg tablet TAKE 1 TABLET BY MOUTH ONCE DAILY IN THE MORNING ON AN EMPTY STOMACH      clopidogrel (PLAVIX) 75 mg tab Take 1 Tab by mouth daily. 30 Tab 2    raNITIdine (ZANTAC) 150 mg tablet Take 150 mg by mouth two (2) times a day. Indications: Stomach Ulcer from Aspirin or Ibuprofen-Like Drugs      aspirin (ASPIRIN) 325 mg tablet Take 325 mg by mouth daily. Indications: Prevention for a Blood Clot going to the Brain      cyanocobalamin (VITAMIN B-12) 1,000 mcg tablet Take 1,000 mcg by mouth daily.  cholecalciferol, vitamin d3, (VITAMIN D) 1,000 unit tablet Take  by mouth daily. Allergies   Allergen Reactions    Lisinopril Cough     Past Medical History:   Diagnosis Date    [facility]     Hypertension     [facility]     more than one. first one March 19, 2019. Second was mid april 2019. No past surgical history on file. No family history on file.   Social History     Tobacco Use   Smoking Status Never Smoker   Smokeless Tobacco Never Used          Review of Systems, additional:  Constitutional: negative  Eyes: negative  Respiratory: negative  Cardiovascular: negative  Gastrointestinal: negative  Musculoskeletal:negative  Neurological: negative  Behvioral/Psych: negative  Endocrine: negative  ENT: negative    Objective:     Visit Vitals  /72 (BP 1 Location: Left arm, BP Patient Position: Sitting)   Pulse 61   Temp 97.6 °F (36.4 °C)   Resp 18   Ht 5' 6\" (1.676 m)   Wt 81.6 kg (180 lb)   SpO2 98%   BMI 29.05 kg/m²     General:  alert, cooperative, no distress   Chest Wall: inspection normal - no chest wall deformities or tenderness, respiratory effort normal   Lung: clear to auscultation bilaterally   Heart:  normal rate and regular rhythm, S1 and S2 normal, no murmurs noted, no gallops noted, no JVD   Abdomen: soft, non-tender. Bowel sounds normal. No masses,  no organomegaly   Extremities: extremities normal, atraumatic, no cyanosis or edema Skin: no rashes   Neuro: alert, oriented, normal speech, no  movement disorder noted     [id number]. Normal sinus rhythm. Normal.    Assessment/Plan:       ICD-10-CM ICD-9-CM    1. TIA (transient ischemic attack), the patient had a 30-day event monitor  with no significant arrhythmia noted. Return 12 months G45.9 435.9 CARDIAC EVENT MONITOR   2. Type 2 diabetes mellitus with complication, without long-term current use of insulin (HCC) E11.8 250.90    3. History of CVA (cerebrovascular accident), right occipital infarct with right CVA stenosis with residual visual field cut over left visual field. She follows with  Z86.73 V12.54    4. Essential hypertension, mildly elevated systolic BP in the office again today I10 401.9    5. FINA (obstructive sleep apnea) G47.33 327.23    6       History of echocardiogram, 2019. Normal left ventricular systolic function. Grade 1 diastolic dysfunction. No valvular pathology.   Negative  bubble study